# Patient Record
Sex: MALE | Race: WHITE | NOT HISPANIC OR LATINO | Employment: OTHER | URBAN - METROPOLITAN AREA
[De-identification: names, ages, dates, MRNs, and addresses within clinical notes are randomized per-mention and may not be internally consistent; named-entity substitution may affect disease eponyms.]

---

## 2021-11-19 ENCOUNTER — APPOINTMENT (OUTPATIENT)
Dept: GENERAL RADIOLOGY | Facility: CLINIC | Age: 86
DRG: 395 | End: 2021-11-19
Attending: EMERGENCY MEDICINE
Payer: COMMERCIAL

## 2021-11-19 ENCOUNTER — HOSPITAL ENCOUNTER (INPATIENT)
Facility: CLINIC | Age: 86
LOS: 2 days | Discharge: HOME | DRG: 395 | End: 2021-11-22
Attending: EMERGENCY MEDICINE | Admitting: STUDENT IN AN ORGANIZED HEALTH CARE EDUCATION/TRAINING PROGRAM
Payer: COMMERCIAL

## 2021-11-19 ENCOUNTER — APPOINTMENT (OUTPATIENT)
Dept: CT IMAGING | Facility: CLINIC | Age: 86
DRG: 395 | End: 2021-11-19
Attending: EMERGENCY MEDICINE
Payer: COMMERCIAL

## 2021-11-19 DIAGNOSIS — K59.00 CONSTIPATION, UNSPECIFIED CONSTIPATION TYPE: ICD-10-CM

## 2021-11-19 DIAGNOSIS — K56.609 SBO (SMALL BOWEL OBSTRUCTION) (H): ICD-10-CM

## 2021-11-19 LAB
ANION GAP SERPL CALCULATED.3IONS-SCNC: 5 MMOL/L (ref 3–14)
BASOPHILS # BLD AUTO: 0 10E3/UL (ref 0–0.2)
BASOPHILS NFR BLD AUTO: 0 %
BUN SERPL-MCNC: 17 MG/DL (ref 7–30)
CALCIUM SERPL-MCNC: 8.8 MG/DL (ref 8.5–10.1)
CHLORIDE BLD-SCNC: 104 MMOL/L (ref 94–109)
CO2 SERPL-SCNC: 27 MMOL/L (ref 20–32)
CREAT SERPL-MCNC: 1.22 MG/DL (ref 0.66–1.25)
EOSINOPHIL # BLD AUTO: 0.1 10E3/UL (ref 0–0.7)
EOSINOPHIL NFR BLD AUTO: 1 %
ERYTHROCYTE [DISTWIDTH] IN BLOOD BY AUTOMATED COUNT: 13.4 % (ref 10–15)
GFR SERPL CREATININE-BSD FRML MDRD: 49 ML/MIN/1.73M2
GLUCOSE BLD-MCNC: 106 MG/DL (ref 70–99)
HCT VFR BLD AUTO: 43.8 % (ref 40–53)
HGB BLD-MCNC: 14.6 G/DL (ref 13.3–17.7)
IMM GRANULOCYTES # BLD: 0 10E3/UL
IMM GRANULOCYTES NFR BLD: 0 %
LACTATE SERPL-SCNC: 0.9 MMOL/L (ref 0.7–2)
LYMPHOCYTES # BLD AUTO: 0.7 10E3/UL (ref 0.8–5.3)
LYMPHOCYTES NFR BLD AUTO: 6 %
MCH RBC QN AUTO: 32.3 PG (ref 26.5–33)
MCHC RBC AUTO-ENTMCNC: 33.3 G/DL (ref 31.5–36.5)
MCV RBC AUTO: 97 FL (ref 78–100)
MONOCYTES # BLD AUTO: 0.7 10E3/UL (ref 0–1.3)
MONOCYTES NFR BLD AUTO: 7 %
NEUTROPHILS # BLD AUTO: 9 10E3/UL (ref 1.6–8.3)
NEUTROPHILS NFR BLD AUTO: 86 %
NRBC # BLD AUTO: 0 10E3/UL
NRBC BLD AUTO-RTO: 0 /100
PLATELET # BLD AUTO: 262 10E3/UL (ref 150–450)
POTASSIUM BLD-SCNC: 4.6 MMOL/L (ref 3.4–5.3)
RBC # BLD AUTO: 4.52 10E6/UL (ref 4.4–5.9)
SODIUM SERPL-SCNC: 136 MMOL/L (ref 133–144)
WBC # BLD AUTO: 10.5 10E3/UL (ref 4–11)

## 2021-11-19 PROCEDURE — 36415 COLL VENOUS BLD VENIPUNCTURE: CPT | Performed by: EMERGENCY MEDICINE

## 2021-11-19 PROCEDURE — 85025 COMPLETE CBC W/AUTO DIFF WBC: CPT | Performed by: EMERGENCY MEDICINE

## 2021-11-19 PROCEDURE — 74019 RADEX ABDOMEN 2 VIEWS: CPT

## 2021-11-19 PROCEDURE — 51702 INSERT TEMP BLADDER CATH: CPT

## 2021-11-19 PROCEDURE — 81001 URINALYSIS AUTO W/SCOPE: CPT | Performed by: EMERGENCY MEDICINE

## 2021-11-19 PROCEDURE — 80048 BASIC METABOLIC PNL TOTAL CA: CPT | Performed by: EMERGENCY MEDICINE

## 2021-11-19 PROCEDURE — 83605 ASSAY OF LACTIC ACID: CPT | Performed by: EMERGENCY MEDICINE

## 2021-11-19 PROCEDURE — 99285 EMERGENCY DEPT VISIT HI MDM: CPT | Mod: 25

## 2021-11-19 PROCEDURE — 74177 CT ABD & PELVIS W/CONTRAST: CPT

## 2021-11-19 RX ORDER — IOPAMIDOL 755 MG/ML
73 INJECTION, SOLUTION INTRAVASCULAR ONCE
Status: COMPLETED | OUTPATIENT
Start: 2021-11-19 | End: 2021-11-20

## 2021-11-19 ASSESSMENT — ENCOUNTER SYMPTOMS
CONSTIPATION: 1
NAUSEA: 1
VOMITING: 0

## 2021-11-20 ENCOUNTER — APPOINTMENT (OUTPATIENT)
Dept: GENERAL RADIOLOGY | Facility: CLINIC | Age: 86
DRG: 395 | End: 2021-11-20
Attending: SURGERY
Payer: COMMERCIAL

## 2021-11-20 PROBLEM — K59.00 CONSTIPATION, UNSPECIFIED CONSTIPATION TYPE: Status: ACTIVE | Noted: 2021-11-20

## 2021-11-20 PROBLEM — K56.609 SBO (SMALL BOWEL OBSTRUCTION) (H): Status: ACTIVE | Noted: 2021-11-20

## 2021-11-20 LAB
ALBUMIN UR-MCNC: 10 MG/DL
APPEARANCE UR: CLEAR
BILIRUB UR QL STRIP: NEGATIVE
COLOR UR AUTO: YELLOW
GLUCOSE UR STRIP-MCNC: NEGATIVE MG/DL
HGB UR QL STRIP: NEGATIVE
KETONES UR STRIP-MCNC: NEGATIVE MG/DL
LEUKOCYTE ESTERASE UR QL STRIP: ABNORMAL
MUCOUS THREADS #/AREA URNS LPF: PRESENT /LPF
NITRATE UR QL: NEGATIVE
PH UR STRIP: 5.5 [PH] (ref 5–7)
RBC URINE: 2 /HPF
SARS-COV-2 RNA RESP QL NAA+PROBE: NEGATIVE
SP GR UR STRIP: 1.02 (ref 1–1.03)
UROBILINOGEN UR STRIP-MCNC: NORMAL MG/DL
WBC URINE: 4 /HPF

## 2021-11-20 PROCEDURE — 250N000009 HC RX 250: Performed by: EMERGENCY MEDICINE

## 2021-11-20 PROCEDURE — C9803 HOPD COVID-19 SPEC COLLECT: HCPCS

## 2021-11-20 PROCEDURE — 99222 1ST HOSP IP/OBS MODERATE 55: CPT | Performed by: SURGERY

## 2021-11-20 PROCEDURE — 250N000011 HC RX IP 250 OP 636: Performed by: EMERGENCY MEDICINE

## 2021-11-20 PROCEDURE — 250N000013 HC RX MED GY IP 250 OP 250 PS 637: Performed by: EMERGENCY MEDICINE

## 2021-11-20 PROCEDURE — 96360 HYDRATION IV INFUSION INIT: CPT

## 2021-11-20 PROCEDURE — 96361 HYDRATE IV INFUSION ADD-ON: CPT

## 2021-11-20 PROCEDURE — 74018 RADEX ABDOMEN 1 VIEW: CPT

## 2021-11-20 PROCEDURE — 258N000003 HC RX IP 258 OP 636: Performed by: STUDENT IN AN ORGANIZED HEALTH CARE EDUCATION/TRAINING PROGRAM

## 2021-11-20 PROCEDURE — 99207 PR CDG-CODE CATEGORY CHANGED: CPT | Performed by: STUDENT IN AN ORGANIZED HEALTH CARE EDUCATION/TRAINING PROGRAM

## 2021-11-20 PROCEDURE — 99223 1ST HOSP IP/OBS HIGH 75: CPT | Performed by: STUDENT IN AN ORGANIZED HEALTH CARE EDUCATION/TRAINING PROGRAM

## 2021-11-20 PROCEDURE — 99207 PR NO CHARGE LOS: CPT | Performed by: HOSPITALIST

## 2021-11-20 PROCEDURE — G0378 HOSPITAL OBSERVATION PER HR: HCPCS

## 2021-11-20 PROCEDURE — 87635 SARS-COV-2 COVID-19 AMP PRB: CPT | Performed by: EMERGENCY MEDICINE

## 2021-11-20 PROCEDURE — 250N000013 HC RX MED GY IP 250 OP 250 PS 637: Performed by: STUDENT IN AN ORGANIZED HEALTH CARE EDUCATION/TRAINING PROGRAM

## 2021-11-20 PROCEDURE — 120N000004 HC R&B MS OVERFLOW

## 2021-11-20 RX ORDER — VITAMIN E 268 MG
400 CAPSULE ORAL 2 TIMES DAILY
COMMUNITY

## 2021-11-20 RX ORDER — MAGNESIUM CARB/ALUMINUM HYDROX 105-160MG
30 TABLET,CHEWABLE ORAL DAILY PRN
COMMUNITY

## 2021-11-20 RX ORDER — SODIUM CHLORIDE, SODIUM LACTATE, POTASSIUM CHLORIDE, CALCIUM CHLORIDE 600; 310; 30; 20 MG/100ML; MG/100ML; MG/100ML; MG/100ML
INJECTION, SOLUTION INTRAVENOUS CONTINUOUS
Status: DISCONTINUED | OUTPATIENT
Start: 2021-11-20 | End: 2021-11-21

## 2021-11-20 RX ORDER — POLYETHYLENE GLYCOL 3350 17 G/17G
17 POWDER, FOR SOLUTION ORAL DAILY
Status: DISCONTINUED | OUTPATIENT
Start: 2021-11-20 | End: 2021-11-22 | Stop reason: HOSPADM

## 2021-11-20 RX ORDER — ACETAMINOPHEN 325 MG/1
650 TABLET ORAL EVERY 6 HOURS PRN
Status: DISCONTINUED | OUTPATIENT
Start: 2021-11-20 | End: 2021-11-22 | Stop reason: HOSPADM

## 2021-11-20 RX ORDER — BISACODYL 10 MG
10 SUPPOSITORY, RECTAL RECTAL DAILY PRN
Status: DISCONTINUED | OUTPATIENT
Start: 2021-11-20 | End: 2021-11-22 | Stop reason: HOSPADM

## 2021-11-20 RX ORDER — TAMSULOSIN HYDROCHLORIDE 0.4 MG/1
1 CAPSULE ORAL DAILY
COMMUNITY
Start: 2021-03-11 | End: 2022-03-11

## 2021-11-20 RX ORDER — SENNOSIDES 8.6 MG
1-2 TABLET ORAL 2 TIMES DAILY PRN
Status: DISCONTINUED | OUTPATIENT
Start: 2021-11-20 | End: 2021-11-22 | Stop reason: HOSPADM

## 2021-11-20 RX ORDER — ONDANSETRON 2 MG/ML
4 INJECTION INTRAMUSCULAR; INTRAVENOUS EVERY 6 HOURS PRN
Status: DISCONTINUED | OUTPATIENT
Start: 2021-11-20 | End: 2021-11-22 | Stop reason: HOSPADM

## 2021-11-20 RX ORDER — ONDANSETRON 4 MG/1
4 TABLET, ORALLY DISINTEGRATING ORAL EVERY 6 HOURS PRN
Status: DISCONTINUED | OUTPATIENT
Start: 2021-11-20 | End: 2021-11-22 | Stop reason: HOSPADM

## 2021-11-20 RX ORDER — ACETAMINOPHEN 650 MG/1
650 SUPPOSITORY RECTAL EVERY 6 HOURS PRN
Status: DISCONTINUED | OUTPATIENT
Start: 2021-11-20 | End: 2021-11-22 | Stop reason: HOSPADM

## 2021-11-20 RX ORDER — LIDOCAINE 40 MG/G
CREAM TOPICAL
Status: DISCONTINUED | OUTPATIENT
Start: 2021-11-20 | End: 2021-11-22 | Stop reason: HOSPADM

## 2021-11-20 RX ADMIN — SENNOSIDES 1 TABLET: 8.6 TABLET, FILM COATED ORAL at 20:14

## 2021-11-20 RX ADMIN — IOPAMIDOL 73 ML: 755 INJECTION, SOLUTION INTRAVENOUS at 00:05

## 2021-11-20 RX ADMIN — SODIUM CHLORIDE, POTASSIUM CHLORIDE, SODIUM LACTATE AND CALCIUM CHLORIDE: 600; 310; 30; 20 INJECTION, SOLUTION INTRAVENOUS at 03:15

## 2021-11-20 RX ADMIN — DIATRIZOATE MEGLUMINE AND DIATRIZOATE SODIUM 90 ML: 660; 100 SOLUTION ORAL; RECTAL at 10:46

## 2021-11-20 RX ADMIN — ACETAMINOPHEN 650 MG: 325 TABLET, FILM COATED ORAL at 13:58

## 2021-11-20 RX ADMIN — SODIUM CHLORIDE, POTASSIUM CHLORIDE, SODIUM LACTATE AND CALCIUM CHLORIDE: 600; 310; 30; 20 INJECTION, SOLUTION INTRAVENOUS at 13:57

## 2021-11-20 RX ADMIN — DOCUSATE SODIUM 286 ML: 50 LIQUID ORAL at 00:22

## 2021-11-20 RX ADMIN — ACETAMINOPHEN 650 MG: 325 TABLET, FILM COATED ORAL at 22:29

## 2021-11-20 RX ADMIN — ACETAMINOPHEN 325 MG: 325 TABLET, FILM COATED ORAL at 16:44

## 2021-11-20 RX ADMIN — SODIUM CHLORIDE 61 ML: 900 INJECTION INTRAVENOUS at 00:06

## 2021-11-20 ASSESSMENT — ACTIVITIES OF DAILY LIVING (ADL)
ADLS_ACUITY_SCORE: 20
ADLS_ACUITY_SCORE: 19
ADLS_ACUITY_SCORE: 19
ADLS_ACUITY_SCORE: 20
ADLS_ACUITY_SCORE: 19
ADLS_ACUITY_SCORE: 20

## 2021-11-20 NOTE — ED PROVIDER NOTES
History   Chief Complaint:  Constipation    HPI   Musa Rao is a 89 year old male with history of enlarged prostate who presents via EMS with constipation, worse in the last week. He also mentions that he has not urinated in at least a day. Adds that he is nauseous but denies vomiting. Denies any previous abdominal surgeries.     Review of Systems   Gastrointestinal: Positive for constipation and nausea. Negative for vomiting.   Genitourinary: Positive for decreased urine volume.   All other systems reviewed and are negative.      Allergies:  The patient has no known allergies.     Medications:  Flomax    Past Medical History:     Enlarged prostate    BPH    Past Surgical History:    The patient denies past abdominal surgical history.     Social History:  The patient presents to the ED alone via EMS    Physical Exam     Patient Vitals for the past 24 hrs:   BP Temp Temp src SpO2 Weight   11/19/21 2350 (!) 168/93 -- -- 98 % --   11/19/21 2349 -- -- -- -- 64.9 kg (143 lb)   11/19/21 2317 -- 98.2  F (36.8  C) Oral -- --   11/19/21 2300 (!) 171/96 -- -- 98 % --       Physical Exam  Vitals and nursing note reviewed.   Constitutional:       Comments: Elderly frail-appearing   HENT:      Head: Normocephalic.   Cardiovascular:      Rate and Rhythm: Normal rate and regular rhythm.   Pulmonary:      Effort: Pulmonary effort is normal.      Breath sounds: Normal breath sounds.   Abdominal:      Comments: Moderately distended decreased bowel sounds.  Mildly tender diffusely distended bladder.   Genitourinary:     Comments: Rectum is a moderate amount of stool in the rectum.  No external hemorrhoids.  Manual disimpaction performed with a small amount of stool removed  Skin:     General: Skin is warm.      Capillary Refill: Capillary refill takes less than 2 seconds.   Neurological:      Mental Status: He is alert and oriented to person, place, and time.   Psychiatric:         Mood and Affect: Mood normal.          Emergency Department Course     Imaging:  Abdomen XR, 2 vw, flat and upright   Final Result   IMPRESSION: Abundant air present throughout the GI tract within small bowel and colon most suggestive of adynamic ileus versus distal colonic obstruction. Moderate stool seen within colon. On the decubitus views there is no definite evidence for    pneumoperitoneum.       CT Abdomen Pelvis w Contrast    (Results Pending)     Report per radiology    Laboratory:  Labs Ordered and Resulted from Time of ED Arrival to Time of ED Departure   BASIC METABOLIC PANEL - Abnormal       Result Value    Sodium 136      Potassium 4.6      Chloride 104      Carbon Dioxide (CO2) 27      Anion Gap 5      Urea Nitrogen 17      Creatinine 1.22      Calcium 8.8      Glucose 106 (*)     GFR Estimate 49 (*)    CBC WITH PLATELETS AND DIFFERENTIAL - Abnormal    WBC Count 10.5      RBC Count 4.52      Hemoglobin 14.6      Hematocrit 43.8      MCV 97      MCH 32.3      MCHC 33.3      RDW 13.4      Platelet Count 262      % Neutrophils 86      % Lymphocytes 6      % Monocytes 7      % Eosinophils 1      % Basophils 0      % Immature Granulocytes 0      NRBCs per 100 WBC 0      Absolute Neutrophils 9.0 (*)     Absolute Lymphocytes 0.7 (*)     Absolute Monocytes 0.7      Absolute Eosinophils 0.1      Absolute Basophils 0.0      Absolute Immature Granulocytes 0.0      Absolute NRBCs 0.0     LACTIC ACID WHOLE BLOOD - Normal    Lactic Acid 0.9     ROUTINE UA WITH MICROSCOPIC REFLEX TO CULTURE        Emergency Department Course:  Reviewed:  I reviewed nursing notes and vitals    Assessments:  2243 I obtained history and examined the patient as noted above. Bladder scanner: 550 mL    2324 I rechecked the patient.     Consults:  DR DUANE JACOB GENERAL SURGERY 12:50AM    Interventions:  Medications   pink lady enema (COMPOUNDED: docusate, magnesium citrate, mineral oil, sodium phosphate) (has no administration in time range)   iopamidol (ISOVUE-370)  solution 73 mL (has no administration in time range)   Saline flush (has no administration in time range)   iopamidol (ISOVUE-370) solution 73 mL (has no administration in time range)   Saline flush (has no administration in time range)     Disposition:  The patient was admitted to the hospital under the care of Dr. Santamaria.     Impression & Plan     Medical Decision Making:  Patient presents distended abdomen and difficulty going to the bathroom.  Patient has no history of prior surgery in the abdomen clinical suspicions are for fecal impaction a mild amount of disimpaction was performed by me but patient did not tolerate this well and enema was attempted and he did not tolerate this well as well.  Plain films of the abdomen suggest a large amount of stool in the rectum and colon family is concerned about small bowel obstruction and therefore CT was offered is a further assessment.  CT is read by radiology as possible mechanical small bowel obstruction as well as a large amount of stool in the colon I did not patient is a negative lactic acid but due to the internal hernia read I did consult general surgery who felt it was not an emergent consult and will follow up in the morning.  Care was discussed with Dr. Arce and we will admit for observation.      Diagnosis:    ICD-10-CM    1. Constipation, unspecified constipation type  K59.00    2. SBO (small bowel obstruction) (H)  K56.609        Discharge Medications:  New Prescriptions    No medications on file     Scribe Disclosure:  I, Nehemias North, am serving as a scribe at 10:43 PM on 11/19/2021 to document services personally performed by Patrick Shelton MD based on my observations and the provider's statements to me.                 Patrick Shelton MD  11/20/21 0050

## 2021-11-20 NOTE — PLAN OF CARE
A/Ox4, forgetful a times. VSS on RA. Denies pain/nausea. Abdomen distended soft to touch, BS active. Loose/ semi-formed brown BM x2, on bowel regime. Ax1 W/GB, mobility slow w/ rounded back. Rios cath in place for retention, leaking small smount blood at urethral site at times.Has bandage on left toe from PTA abrasion. LR infusing @ 100ml/hr. Xray,CT, UA done, CT found possible SBO/internal hernia. NPO w/ ice chips for surgery consult.     Observation goals  PRIOR TO DISCHARGE        -diagnostic tests and consults completed and resulted not met  -vital signs normal or at patient baseline met  -tolerating oral intake to maintain hydration not met  -adequate pain control on oral analgesics met  -safe disposition plan has been identified  met

## 2021-11-20 NOTE — PROGRESS NOTES
RECEIVING UNIT ED HANDOFF REVIEW    ED Nurse Handoff Report was reviewed by: Alexandra Gleason RN on November 20, 2021 at 2:30 AM

## 2021-11-20 NOTE — PLAN OF CARE
Observation Goals     -diagnostic tests and consults completed and resulted Not Met  -vital signs normal or at patient baseline Met BP (!) 123/38 (BP Location: Left arm)   Pulse 69   Temp 99.4  F (37.4  C) (Oral)   Resp 16   Wt 64.9 kg (143 lb)   SpO2 93%       -tolerating oral intake to maintain hydration Not Met: NPO, IVF running  -adequate pain control on oral analgesics Met  -safe disposition plan has been identified Not Met  Nurse to notify provider when observation goals have been met and patient is ready for discharge.    Pt is A&Ox4, VSS, and is NPO. Pt seen by surgery this AM (see note for details) and gastrografin xray ordered. Gastrografin given at 1100, xray to happen around 1900. Pt's dtr Yolanda at the bedside. Pt had loose stool overnight but had a small, formed BM this AM. Pt is up with Ax1 w/ walker, Rios in place and draining well, small amount of blood leaking from insertion site. LR@100 mL/hr. Nurse will continue to monitor.

## 2021-11-20 NOTE — PROGRESS NOTES
MD Notification    Notified Person: MD    Notified Person Name: Karolina      Notification Date/Time: 11/20/2021 2825       Notification Interaction: via text        Purpose of Notification: pt having severe spasm like pain that comes and goes. Current order is NPO. Please advise if ok to give tylenol    Orders Received:    Comments:

## 2021-11-20 NOTE — PLAN OF CARE
Shift [id number]    Neuro: A&Ox4, intermittently forgetful  Cardiac: WDL  Respiratory: No complaints of SOB, lung sounds diminished in the bases  GI/: Rios in place, small amounts of blood at the urethra, draining well. Loose stool overnight, small, soft, formed BM this shift.  Diet/appetite: NPO  Activity: Ax1 w/ walker  Pain: Denied pain most of the morning, in pain this afternoon, agreed to take 1 tab of Tylenol  Skin: WDL  Lines: RPIV infusing LR@100 mL/hr    BP (!) 123/38 (BP Location: Left arm)   Pulse 69   Temp 99.4  F (37.4  C) (Oral)   Resp 16   Wt 64.9 kg (143 lb)   SpO2 93%

## 2021-11-20 NOTE — CONSULTS
General Surgery Consultation    Musa Rao MRN# [id number]   Age: 89 year old YOB: 1922     Date of Admission:  11/19/2021    Reason for consult: Small bowel obstruction       Requesting physician: MD Bing                Assessment and Plan:   Assessment:   89-year-old gentleman brought to the emergency department due to abdominal distention and constipation.  Imaging suggestive for small bowel obstruction.  Concern on imaging for mesenteric swirling suggestive of possible internal herniation.      Plan:   Patient's abdominal exam is completely unremarkable.  He has no tenderness to palpation.  Does not have significant discomfort or pain at baseline.  Has started to have liquid stools.  No prior history of abdominal surgery.  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  We'll at this point try Gastroview challenge.             Chief Complaint:   Abdominal distention and constipation     History is obtained from the patient and electronic health record         History of Present Illness:   This patient is a 89 year old  male  who presents with chief complaint of constipation and abdominal discomfort.  He presented the emergency department last night with these complaints.  He has some mild nausea but no vomiting.  Denied any other significant recent changes in health.  He is also had some urinary retention related to his constipation.  Since admission he started to have bowel movements.  He presently denies significant discomfort or pain.  Denies nausea.          Past Medical History:    has no past medical history on file.          Past Surgical History:   No past surgical history on file.  Specifically denies history of abdominal surgery.        Medications:   No current facility-administered medications on file prior to encounter.  tamsulosin (FLOMAX) 0.4 MG capsule, Take 1 capsule by mouth daily          Allergies:    No Known Allergies          Social History:   Denies tobacco or significant alcohol use.          Family History:   The patient has no family history of any bleeding, clotting or anesthesia problems.          Review of Systems:   Ten point Review of Systems is negative other than noted in the HPI          Physical Exam:   Gen:  This is a well developed, well nourished man in no apparent distress.  Blood pressure 127/53, pulse 88, temperature 99.3  F (37.4  C), temperature source Oral, resp. rate 16, weight 64.9 kg (143 lb), SpO2 94 %.  HEENT - Normocephalic, atraumatic, mucous membranes moist.  no scleral icterus.  Neck - supple without masses  Lungs - clear to ascultation.    Heart - Regular rate & rhythm without murmur  Abdomen:   soft, non-distended, non-tender and no masses palpated, normal bowel sounds   Extremities - warm without edema  Neurologic - nonfocal          Data:   WBC -   WBC Count   Date Value Ref Range Status   11/19/2021 10.5 4.0 - 11.0 10e3/uL Final   ], HgB -   Hemoglobin   Date Value Ref Range Status   11/19/2021 14.6 13.3 - 17.7 g/dL Final   ]   Liver Function Studies - No results for input(s): PROTTOTAL, ALBUMIN, BILITOTAL, ALKPHOS, AST, ALT, BILIDIRECT in the last 95891 hours.  CT scan of the abdomen:   Personally reviewed   Ultrasound of the abdomen:     Curtis Galdamez MD

## 2021-11-20 NOTE — ED TRIAGE NOTES
Daughters reported decreased pooping over last week and no BM for last 3 days. c/o abdominal pain from belly button to rectum. Daughters tried enema and 2 suppositories which just made him nauseous.

## 2021-11-20 NOTE — UTILIZATION REVIEW
[facility]   Admission Status; Secondary Review Determination   Admission date:  11/19/2021 10:34 PM    Under the authority of the Utilization Management Committee, the utilization review process indicated a secondary review on the above patient. The review outcome is based on review of the medical records, discussions with staff, and applying clinical experience noted on the date of the review.     (x) Inpatient Status Appropriate - This patient's medical care is consistent with medical management for inpatient care and reasonable inpatient medical practice.     RATIONALE FOR DETERMINATION   89-year-old male was admitted this morning with abdominal pain. Imaging is suggestive of small bowel obstruction. There is also concern for mesenteric swirling suggestive of possible internal hernia. General surgery has been consulted and plan for Gastrografin challenge and conservative management at this time. Patient's she still showing no resolution of his obstruction. This patient is elderly, complicated, high risk for clinical deterioration, likely be hospital several days, and appropriate to advance to inpatient status at the time of this review. This recommendation was paged to Dr. Turcios.  The severity of illness, intensity of service provided, expected LOS and risk for adverse outcome make the care complex, high risk and appropriate for hospital admission.    At the time of admission with the information available to the attending physician more than 2 nights Hospital complex care was anticipated, based on patient risk of adverse outcome if treated as outpatient and complex care required.  Inpatient admission is appropriate based on the Medicare guidelines.      The information on this document is developed by the utilization review team in order for the business office to ensure compliance. This only denotes the appropriateness of proper admission status and does not reflect the quality of care  rendered.   The definitions of Inpatient Status and Observation Status used in making the determination above are those provided in the CMS Coverage Manual, Chapter 1 and Chapter 6, section 70.4.     Sincerely,   Bhavin Brenner DO MPH   Physician Advisor  Utilization Review  [facility]

## 2021-11-20 NOTE — ED NOTES
[facility]  ED Nurse Handoff Report    ED Chief complaint: Constipation (Daughters reported decreased pooping over last week and no BM for last 3 days. c/o abdominal pain from belly button to rectum. Daughters tried enema and 2 suppositories which just made him nauseous.)      ED Diagnosis:   Final diagnoses:   None       Code Status: code status to e address    Allergies: No Known Allergies    Patient Story: patient here with constipation. He stated he has been constipated with abdominal pain for 3 days.his daughter tried enema which did not help. Patient lives alone ,ambulates with a walker.  His daughter help him out most of the time  Focused Assessment:  See above    Treatments and/or interventions provided: xray,ct ,labs and a enema which he could not hold  Patient's response to treatments and/or interventions: ongoing    To be done/followed up on inpatient unit:  .    Does this patient have any cognitive concerns?:     Activity level - Baseline/Home:  Walker  Activity Level - Current:   Walker    Patient's Preferred language: English   Needed?: No    Isolation: None  Infection: Not Applicable  Patient tested for COVID 19 prior to admission: YES  Bariatric?: No    Vital Signs:   Vitals:    11/19/21 2300 11/19/21 2317 11/19/21 2349 11/19/21 2350   BP: (!) 171/96   (!) 168/93   Temp:  98.2  F (36.8  C)     TempSrc:  Oral     SpO2: 98%   98%   Weight:   64.9 kg (143 lb)        Cardiac Rhythm:     Was the PSS-3 completed:   Yes  What interventions are required if any?               Family Comments: daughter here  With patient  OBS brochure/video discussed/provided to patient/family: No              Name of person given brochure if not patient: .              Relationship to patient: .    For the majority of the shift this patient's behavior was Green.   Behavioral interventions performed were none.    ED NURSE PHONE NUMBER: [phone]

## 2021-11-20 NOTE — ED NOTES
Bed: ED10  Expected date:   Expected time:   Means of arrival:   Comments:  HEMS 432 89M constipation eta 1802

## 2021-11-20 NOTE — PHARMACY-ADMISSION MEDICATION HISTORY
Pharmacy Medication History  Admission medication history interview status for the 11/19/2021  admission is complete. See EPIC admission navigator for prior to admission medications     Location of Interview: Outside patient room but on unit  Medication history sources: Patient's family/friend (Daughter Cyndee), Surescripts and Care Everywhere    Significant changes made to the medication list:  NA - New list    In the past week, patient estimated taking medication this percent of the time: greater than 90%    Additional medication history information:   Patients daughters tried to use OTC enema & suppositories (unsure of exact medications) but were unsuccessful as they wouldn't stay inserted.     Medication reconciliation completed by provider prior to medication history? N/A    Time spent in this activity: 20 minutes    Prior to Admission medications    Medication Sig Last Dose Taking? Auth Provider   mineral oil liquid Take 30 mLs by mouth daily as needed for constipation 11/19/2021 at PRN Yes Unknown, Entered By History   tamsulosin (FLOMAX) 0.4 MG capsule Take 1 capsule by mouth daily Past Week at Unknown time Yes Unknown, Entered By History   vitamin E (TOCOPHEROL) 400 units (180 mg) capsule Take 400 Units by mouth 2 times daily Past Week at Unknown time Yes Unknown, Entered By History       The information provided in this note is only as accurate as the sources available at the time of update(s)

## 2021-11-20 NOTE — PROGRESS NOTES
[facility]    Medicine Progress Note - Hospitalist Service       Date of Admission:  11/19/2021    Assessment & Plan         Musa Rao is a 89 year old male admitted on 11/19/2021. He presents with constipation / abdominal discomfort.    SBO (small bowel obstruction) (H)  Constipation, unspecified constipation type  Presented with 1 week history of worsening constipation.  CT abdomen/pelvis on admission showed evidence for possible mechanical small bowel obstruction. Additionally, there was some twisting seen in the mesentery in the right lower quadrant raising suspicion for potential internal hernia as a cause of the obstruction. No abnormal bowel wall thickening. No evidence for bowel wall pneumatosis or free air.    Admit to inpatient.    General surgery consulted, appreciate their assistance.    Symptoms much improved today per general surgery. Plan for gastrograffin challenge.    NPO.    IV fluids.    As needed pain and nausea medications.       Diet: NPO for Medical/Clinical Reasons Except for: Ice Chips    DVT Prophylaxis: Pneumatic Compression Devices  Rios Catheter: PRESENT, indication: Retention  Central Lines: None  Code Status: No CPR- Pre-arrest intubation OK      Disposition Plan   Expected discharge: Admit to inpatient.  He will be in the hospital at least 1 more night.     The patient's care was discussed with the Bedside Nurse and Patient's Family.    Reynaldo Turcios MD  Hospitalist Service  [facility]  Securely message with the Vocera Web Console (learn more here)  Text page via [employer] Paging/Directory        Clinically Significant Risk Factors Present on Admission                   ______________________________________________________________________    Interval History   Musa Rao was seen this morning.  He was sleeping and I did not wake him up.  Discussed plan of care with family outside the room this morning.    Data  reviewed today: I reviewed all medications, new labs and imaging results over the last 24 hours. I personally reviewed no images or EKG's today.    Physical Exam   Vital Signs: Temp: 99.3  F (37.4  C) Temp src: Oral BP: 127/53 Pulse: 88   Resp: 16 SpO2: 94 % O2 Device: None (Room air)    Weight: 143 lbs 0 oz  Constitutional: Sleeping in bed, I did not try to wake him up.    Data   Recent Labs   Lab 11/19/21  2305   WBC 10.5   HGB 14.6   MCV 97         POTASSIUM 4.6   CHLORIDE 104   CO2 27   BUN 17   CR 1.22   ANIONGAP 5   JEFF 8.8   *     Medications     lactated ringers 100 mL/hr at 11/20/21 1357       polyethylene glycol  17 g Oral Daily     sodium chloride (PF)  3 mL Intracatheter Q8H

## 2021-11-20 NOTE — PLAN OF CARE
Observation Goals    -diagnostic tests and consults completed and resulted Not Met  -vital signs normal or at patient baseline Met /53 (BP Location: Left arm)   Pulse 88   Temp 99.3  F (37.4  C) (Oral)   Resp 16   Wt 64.9 kg (143 lb)   SpO2 94%     -tolerating oral intake to maintain hydration Not Met: NPO, IVF running  -adequate pain control on oral analgesics Met  -safe disposition plan has been identified Not Met  Nurse to notify provider when observation goals have been met and patient is ready for discharge.

## 2021-11-20 NOTE — H&P
[facility]    History and Physical - Hospitalist Service       Date of Admission:  11/19/2021    Assessment & Plan      Musa Rao is a 89 year old male admitted on 11/19/2021. He presents with constipation / abdominal discomfort.      Constipation, unspecified constipation type    SBO (small bowel obstruction) (H)    Assessment: Presents with 1 week history of worsening constipation.  CT abdomen on admission shows evidence for possible mechanical small bowel obstruction. Additionally, there is some twisting seen in the mesentery in the right lower quadrant raising suspicion for potential internal hernia as a cause of the obstruction. No abnormal bowel wall thickening. No evidence for bowel wall pneumatosis or free air.    Plan:   - admit to observation  - General surgery eval  - Bowel regimen  - IVF  - Follow vitals / temp       Diet: NPO for Medical/Clinical Reasons Except for: Ice Chips   DVT Prophylaxis: Pneumatic Compression Devices  Rios Catheter: PRESENT, indication: Retention  Central Lines: None  Code Status:   DNR    Clinically Significant Risk Factors Present on Admission                   Disposition Plan   Expected discharge:  tomorrow recommended to prior living arrangement once improvement in constipation.     The patient's care was discussed with the Patient and ED Provider.    Karan Smart MD  [facility]  Securely message with the Vocera Web Console (learn more here)  Text page via [email or web address] Paging/Directory        ______________________________________________________________________    Chief Complaint     Constipation    History is obtained from the patient    History of Present Illness      Musa Rao is a 89 year old male with past medical history of enlarged prostate who presents for evaluation of constipation.    Patient ports that for the past week, he has had worsening constipation and abdominal discomfort.  He endorses  nausea but no vomiting.  He denies any recent fevers or chills.  He denies any recent weight loss.  He has no prior history of bowel obstructions, abdominal malignancy.  He denies any chest pain or shortness of breath.  He denies any recent new medications or recent use of narcotics.  He reports that his constipation worsens the point where he had not urinated for day.  He denies any recent dysuria or hematuria.  At this time he has no other complaints.    Review of Systems      The 10 point Review of Systems is negative other than noted in the HPI or here.     Past Medical History      I have reviewed this patient's medical history and updated it with pertinent information if needed.   No past medical history on file.    Past Surgical History      I have reviewed this patient's surgical history and updated it with pertinent information if needed.  No past surgical history on file.    Social History   I have reviewed this patient's social history and updated it with pertinent information if needed.  Social History     Tobacco Use     Smoking status: None     Smokeless tobacco: None   Substance Use Topics     Alcohol use: None     Drug use: None       Family History   I have reviewed this patient's family history and updated it with pertinent information if needed.  Family History   Problem Relation Age of Onset     No Known Problems Mother      No Known Problems Father      Prior to Admission Medications   None     Allergies   No Known Allergies    Physical Exam   Vital Signs: Temp: 98.4  F (36.9  C) Temp src: Oral BP: 139/75 Pulse: 88   Resp: 20 SpO2: 97 % O2 Device: None (Room air)    Weight: 143 lbs 0 oz    Constitutional: awake, alert, cooperative, no apparent distress.   Eyes: Lids and lashes normal, pupils equal, round and reactive to light   ENT: Normocephalic, without obvious abnormality, atraumatic, sinuses nontender on palpation   Hematologic / Lymphatic: no cervical lymphadenopathy   Respiratory: CTABL    Cardiovascular: RRR with no m/r/g   GI: hypoactive bowel sounds, soft, non-distended, non-tender.   Skin: normal skin color, texture, turgor   Musculoskeletal: There is no redness, warmth, or swelling of the joints. Full range of motion noted.   Neurologic: Awake, alert, oriented to name, place and time. Cranial nerves II-XII are grossly intact. Motor is 5 out of 5 bilaterally. Sensory is intact.   Neuropsychiatric: normal mood and affect      Data   Data reviewed today: I reviewed all medications, new labs and imaging results over the last 24 hours. I personally reviewed the abdominal x-ray image(s) showing see below and the abdominal CT image(s) showing see below.    CT ABDOMEN  IMPRESSION:   1.  Evidence for mechanical small bowel obstruction as detailed above. Additionally, there is some twisting seen in the mesentery in the right lower quadrant raising suspicion for potential internal hernia as a cause of the obstruction. No abnormal bowel wall thickening. No evidence for bowel wall pneumatosis or free air.  2.  Large amount of stool throughout the colon.  3.  Prostatic enlargement. Rios catheter in the bladder which is decompressed. No hydronephrosis.    AXR  IMPRESSION: Abundant air present throughout the GI tract within small bowel and colon most suggestive of adynamic ileus versus distal colonic obstruction. Moderate stool seen within colon. On the decubitus views there is no definite evidence for   pneumoperitoneum.       Most Recent 3 CBC's:  Recent Labs   Lab Test 11/19/21  2305   WBC 10.5   HGB 14.6   MCV 97        Most Recent 3 BMP's:  Recent Labs   Lab Test 11/19/21  2305      POTASSIUM 4.6   CHLORIDE 104   CO2 27   BUN 17   CR 1.22   ANIONGAP 5   JEFF 8.8   *     Most Recent 2 LFT's:No lab results found.  Most Recent 3 INR's:No lab results found.  Recent Results (from the past 24 hour(s))   Abdomen XR, 2 vw, flat and upright    Narrative    EXAM: XR ABDOMEN 2VIEWS  LOCATION: [facility]  DATE/TIME: 11/19/2021 11:17 PM    INDICATION: ABDOMINAL DISTENSION  COMPARISON: None.      Impression    IMPRESSION: Abundant air present throughout the GI tract within small bowel and colon most suggestive of adynamic ileus versus distal colonic obstruction. Moderate stool seen within colon. On the decubitus views there is no definite evidence for   pneumoperitoneum.    CT Abdomen Pelvis w Contrast    Narrative    EXAM: CT ABDOMEN PELVIS W CONTRAST  LOCATION: [facility]  DATE/TIME: 11/20/2021 12:14 AM    INDICATION: Abdominal distention.  COMPARISON: None.  TECHNIQUE: CT scan of the abdomen and pelvis was performed following injection of IV contrast. Multiplanar reformats were obtained. Dose reduction techniques were used.  CONTRAST: 73 mL Isovue-370    FINDINGS:   LOWER CHEST: Subpleural linear scarring and/or atelectasis in the lung bases.    HEPATOBILIARY: Normal.    PANCREAS: Normal.    SPLEEN: Normal.    ADRENAL GLANDS: Normal.    KIDNEYS/BLADDER: There are a couple tiny renal cysts with no specific follow-up needed. Kidneys are otherwise negative. No hydronephrosis. Rios catheter in a decompressed bladder.    BOWEL: There are multiple mildly dilated mid and proximal loops of small bowel with multiple air-fluid levels present. The very distal small bowel at the terminal ileum is decompressed. Findings are concerning for mechanical small bowel obstruction.   There is some abnormal twisting of the mesentery in the right lower quadrant along with some mild edema. Findings raise the possibility of potential internal hernia as a cause of obstruction. No obvious bowel wall thickening and no free air. Large amount   of stool throughout the colon.    LYMPH NODES: Normal.    VASCULATURE: Normal caliber abdominal aorta.    PELVIC ORGANS: Prostatic enlargement.    MUSCULOSKELETAL: Degenerative and hypertrophic changes lower thoracic and lumbar spine. Kyphotic  deformity at the thoracolumbar junction with moderate compression fracture of the L1 vertebral body. Small fat-containing left inguinal hernia.      Impression    IMPRESSION:   1.  Evidence for mechanical small bowel obstruction as detailed above. Additionally, there is some twisting seen in the mesentery in the right lower quadrant raising suspicion for potential internal hernia as a cause of the obstruction. No abnormal bowel   wall thickening. No evidence for bowel wall pneumatosis or free air.    2.  Large amount of stool throughout the colon.    3.  Prostatic enlargement. Rios catheter in the bladder which is decompressed. No hydronephrosis.

## 2021-11-21 LAB
ANION GAP SERPL CALCULATED.3IONS-SCNC: 7 MMOL/L (ref 3–14)
BUN SERPL-MCNC: 19 MG/DL (ref 7–30)
CALCIUM SERPL-MCNC: 8.1 MG/DL (ref 8.5–10.1)
CHLORIDE BLD-SCNC: 108 MMOL/L (ref 94–109)
CO2 SERPL-SCNC: 24 MMOL/L (ref 20–32)
CREAT SERPL-MCNC: 1.14 MG/DL (ref 0.66–1.25)
ERYTHROCYTE [DISTWIDTH] IN BLOOD BY AUTOMATED COUNT: 13.3 % (ref 10–15)
GFR SERPL CREATININE-BSD FRML MDRD: 53 ML/MIN/1.73M2
GLUCOSE BLD-MCNC: 64 MG/DL (ref 70–99)
GLUCOSE BLDC GLUCOMTR-MCNC: 117 MG/DL (ref 70–99)
HCT VFR BLD AUTO: 39.1 % (ref 40–53)
HGB BLD-MCNC: 12.6 G/DL (ref 13.3–17.7)
MCH RBC QN AUTO: 31.8 PG (ref 26.5–33)
MCHC RBC AUTO-ENTMCNC: 32.2 G/DL (ref 31.5–36.5)
MCV RBC AUTO: 99 FL (ref 78–100)
PLATELET # BLD AUTO: 259 10E3/UL (ref 150–450)
POTASSIUM BLD-SCNC: 4.4 MMOL/L (ref 3.4–5.3)
RBC # BLD AUTO: 3.96 10E6/UL (ref 4.4–5.9)
SODIUM SERPL-SCNC: 139 MMOL/L (ref 133–144)
WBC # BLD AUTO: 7.6 10E3/UL (ref 4–11)

## 2021-11-21 PROCEDURE — 36415 COLL VENOUS BLD VENIPUNCTURE: CPT | Performed by: STUDENT IN AN ORGANIZED HEALTH CARE EDUCATION/TRAINING PROGRAM

## 2021-11-21 PROCEDURE — 250N000013 HC RX MED GY IP 250 OP 250 PS 637: Performed by: HOSPITALIST

## 2021-11-21 PROCEDURE — 120N000004 HC R&B MS OVERFLOW

## 2021-11-21 PROCEDURE — 250N000013 HC RX MED GY IP 250 OP 250 PS 637: Performed by: PHYSICIAN ASSISTANT

## 2021-11-21 PROCEDURE — 99232 SBSQ HOSP IP/OBS MODERATE 35: CPT | Performed by: HOSPITALIST

## 2021-11-21 PROCEDURE — 99231 SBSQ HOSP IP/OBS SF/LOW 25: CPT | Performed by: SURGERY

## 2021-11-21 PROCEDURE — 85027 COMPLETE CBC AUTOMATED: CPT | Performed by: STUDENT IN AN ORGANIZED HEALTH CARE EDUCATION/TRAINING PROGRAM

## 2021-11-21 PROCEDURE — 258N000003 HC RX IP 258 OP 636: Performed by: STUDENT IN AN ORGANIZED HEALTH CARE EDUCATION/TRAINING PROGRAM

## 2021-11-21 PROCEDURE — 80048 BASIC METABOLIC PNL TOTAL CA: CPT | Performed by: STUDENT IN AN ORGANIZED HEALTH CARE EDUCATION/TRAINING PROGRAM

## 2021-11-21 RX ORDER — SIMETHICONE 80 MG
80 TABLET,CHEWABLE ORAL EVERY 6 HOURS PRN
Status: DISCONTINUED | OUTPATIENT
Start: 2021-11-21 | End: 2021-11-22 | Stop reason: HOSPADM

## 2021-11-21 RX ORDER — CALCIUM CARBONATE 500 MG/1
500 TABLET, CHEWABLE ORAL 2 TIMES DAILY PRN
Status: DISCONTINUED | OUTPATIENT
Start: 2021-11-21 | End: 2021-11-22 | Stop reason: HOSPADM

## 2021-11-21 RX ORDER — TAMSULOSIN HYDROCHLORIDE 0.4 MG/1
0.4 CAPSULE ORAL DAILY
Status: DISCONTINUED | OUTPATIENT
Start: 2021-11-21 | End: 2021-11-22 | Stop reason: HOSPADM

## 2021-11-21 RX ADMIN — SODIUM CHLORIDE, POTASSIUM CHLORIDE, SODIUM LACTATE AND CALCIUM CHLORIDE: 600; 310; 30; 20 INJECTION, SOLUTION INTRAVENOUS at 00:14

## 2021-11-21 RX ADMIN — TAMSULOSIN HYDROCHLORIDE 0.4 MG: 0.4 CAPSULE ORAL at 08:47

## 2021-11-21 RX ADMIN — SIMETHICONE 80 MG: 80 TABLET, CHEWABLE ORAL at 12:45

## 2021-11-21 ASSESSMENT — ACTIVITIES OF DAILY LIVING (ADL)
ADLS_ACUITY_SCORE: 16
ADLS_ACUITY_SCORE: 18
EQUIPMENT_CURRENTLY_USED_AT_HOME: WALKER, STANDARD
ADLS_ACUITY_SCORE: 16
DRESSING/BATHING_DIFFICULTY: YES
ADLS_ACUITY_SCORE: 18
DIFFICULTY_COMMUNICATING: NO
ADLS_ACUITY_SCORE: 19
ADLS_ACUITY_SCORE: 19
DOING_ERRANDS_INDEPENDENTLY_DIFFICULTY: YES
ADLS_ACUITY_SCORE: 19
ADLS_ACUITY_SCORE: 16
ADLS_ACUITY_SCORE: 21
ADLS_ACUITY_SCORE: 19
ADLS_ACUITY_SCORE: 16
DIFFICULTY_EATING/SWALLOWING: NO
CONCENTRATING,_REMEMBERING_OR_MAKING_DECISIONS_DIFFICULTY: NO
WALKING_OR_CLIMBING_STAIRS: AMBULATION DIFFICULTY, REQUIRES EQUIPMENT
ADLS_ACUITY_SCORE: 19
ADLS_ACUITY_SCORE: 18
ADLS_ACUITY_SCORE: 18
ADLS_ACUITY_SCORE: 16
ADLS_ACUITY_SCORE: 18
ADLS_ACUITY_SCORE: 19
WEAR_GLASSES_OR_BLIND: NO
ADLS_ACUITY_SCORE: 19
ADLS_ACUITY_SCORE: 18
ADLS_ACUITY_SCORE: 16
ADLS_ACUITY_SCORE: 18
ADLS_ACUITY_SCORE: 18
WALKING_OR_CLIMBING_STAIRS_DIFFICULTY: YES
DRESSING/BATHING: BATHING DIFFICULTY, REQUIRES EQUIPMENT
ADLS_ACUITY_SCORE: 18
TOILETING_ISSUES: NO
FALL_HISTORY_WITHIN_LAST_SIX_MONTHS: NO
ADLS_ACUITY_SCORE: 18

## 2021-11-21 NOTE — PROGRESS NOTES
[facility]    General Surgery  Daily Progress Note       Assessment and Plan:   Musa Rao is a 89 year old male with small bowel obstruction, resolving. Passed GGC yesterday. Patient denies history of abdominal surgery.    PLAN:  - Clear liquid diet this morning, may advance to fulls later today if tolerates this.  - Saline lock once adequate oral intake.  - Patient should abide by low fiber diet for 1 week upon discharge.  - Encourage ambulate QID.  - General Surgery will continue to follow.    DISPOSITION:  - From surgical standpoint, discharge as early as tomorrow if tolerates low fiber diet.        Interval History:   Musa Rao is seen this morning on surgical rounds. He had 3 BM's yesterday and states abdominal pain has resolved since. He does get intermittent cramping prior to BM's but this goes away on its own. He denies nausea with clear liquids this morning. Abdominal film last night with contrast throughout colon and no distended loops of bowel. Hypertensive at times, vitals otherwise wnl.         Physical Exam:   Temp: 98.4  F (36.9  C) Temp src: Oral BP: (!) 147/61 Pulse: 65   Resp: 16 SpO2: 95 % O2 Device: None (Room air)      I/O last 3 completed shifts:  In: -   Out: 775 [Urine:775]    GENERAL: VS reviewed, alert, oriented, no acute distress  LUNGS: Normal respiratory effort, no wheezing  ABDOMEN:  Soft, nontender, non-distended and good bowel sounds  EXTREMITIES: Moving all extremities, no gross deformities  NEUROLOGICAL: Grossly non-focal, mood & affect appropriate    Data   Recent Labs   Lab 11/21/21  0714 11/19/21  2305   WBC 7.6 10.5   HGB 12.6* 14.6   MCV 99 97    262    136   POTASSIUM 4.4 4.6   CHLORIDE 108 104   CO2 24 27   BUN 19 17   CR 1.14 1.22   ANIONGAP 7 5   JEFF 8.1* 8.8   GLC 64* 106*     ABDOMEN XR 11/20  IMPRESSION: There is contrast material throughout the colon, indicating that the small bowel is patent.  There are no  distended air-filled loops of small bowel. No intraperitoneal free air identified on this supine study. There are degenerative changes   in the spine.    Marianne Orellana PA-C    15 minutes spent on date of the encounter doing patient visit, chart review, and documentation.

## 2021-11-21 NOTE — PROGRESS NOTES
[facility]    Medicine Progress Note - Hospitalist Service       Date of Admission:  11/19/2021    Assessment & Plan            Musa Rao is a 89 year old male admitted on 11/19/2021. He presents with constipation / abdominal discomfort.    SBO (small bowel obstruction) (H)  Constipation, unspecified constipation type  Presented with 1 week history of worsening constipation.  CT abdomen/pelvis on admission showed evidence for possible mechanical small bowel obstruction. Additionally, there was some twisting seen in the mesentery in the right lower quadrant raising suspicion for potential internal hernia as a cause of the obstruction. No abnormal bowel wall thickening. No evidence for bowel wall pneumatosis or free air.    Admit to inpatient.    General surgery consulted, appreciate their assistance.    Gastrograffin challenge on 11/20/21 showed contrast throughout the colon.    Symptoms much improved. Denies nausea/abdominal pain. Had 3 BMs in past 24 hours.    Tolerating clear liquids, will advance to full liquids. Can advance to low fiber diet later today if doing well.    Discontinue IV fluids.    Ambulate with nursing.    Possible discharge home tomorrow.    Urinary retention  BPH    Restart PTA tamsulosin.    Remove Rios catheter today for voiding trial. Monitor for urinary retention after Rios removal.    COVID-19 PCR negative    Routine admission COVID-19 PCR testing was negative on 11/20/21.       Diet: Full Liquid Diet  Low Fiber Diet    DVT Prophylaxis: Pneumatic Compression Devices  Rios Catheter: PRESENT, indication: Retention  Central Lines: None  Code Status: No CPR- Pre-arrest intubation OK      Disposition Plan   Expected discharge: likely home tomorrow if no further symptoms and tolerating oral intake     The patient's care was discussed with the Bedside Nurse, Patient and Patient's Family.    Reynaldo Turcios MD  Hospitalist Service  [facility]  [facility]  Securely message with the Vocera Web Console (learn more here)  Text page via [employer] Paging/Directory        Clinically Significant Risk Factors Present on Admission                   ______________________________________________________________________    Interval History   Musa Rao was seen this morning. Feels much better. Tired this morning.  Denies nausea and abdominal pain. Has had three good bowel movements in the past 24 hours. Denies fevers, chest pain, shortness of breath. Discussed options for bowel regimen after discharge. Daughter was in the room with him, discussed plan of care.    Data reviewed today: I reviewed all medications, new labs and imaging results over the last 24 hours. I personally reviewed no images or EKG's today.    Physical Exam   Vital Signs: Temp: 98.4  F (36.9  C) Temp src: Oral BP: (!) 147/61 Pulse: 65   Resp: 16 SpO2: 95 % O2 Device: None (Room air)    Weight: 148 lbs 8 oz  Constitutional: awake, alert, cooperative, no apparent distress, laying in the hospital bed  Respiratory: clear to auscultation bilaterally, no crackles or wheezing  Cardiovascular: regular rate and rhythm, normal S1 and S2, no murmur noted  GI: normal bowel sounds, soft, non-distended, non-tender  Skin: warm, dry  Musculoskeletal: no lower extremity pitting edema present  Neurologic: awake, alert, oriented to name, place and time, moves all extremities    Data   Recent Labs   Lab 11/21/21  1029 11/21/21  0714 11/19/21  2305   WBC  --  7.6 10.5   HGB  --  12.6* 14.6   MCV  --  99 97   PLT  --  259 262   NA  --  139 136   POTASSIUM  --  4.4 4.6   CHLORIDE  --  108 104   CO2  --  24 27   BUN  --  19 17   CR  --  1.14 1.22   ANIONGAP  --  7 5   JEFF  --  8.1* 8.8   * 64* 106*     Medications       polyethylene glycol  17 g Oral Daily     sodium chloride (PF)  3 mL Intracatheter Q8H     tamsulosin  0.4 mg Oral Daily

## 2021-11-21 NOTE — PLAN OF CARE
Inpatient. Patient is alert and oriented x4. VSS on room air. Up SBA/Assist of 1 gb and walker. Infusing  ml/hr, inserted new IV line right fore arm. NPO ex meds. Gastrografin done. Prn tylenol given for pain. PRN senokot given. + BM x3, large and soft. IS in place. Rios in place, patent. Skin is intact. Offer T&R. General surgery following. Continue to monitor.

## 2021-11-21 NOTE — PLAN OF CARE
Assumed care today at 1530. Pt is a/o X4. VSS. NPO since MN. Up with 1 person assist to the bathroom. Some loose stools present with some solid chunks. Incontinence. Rios patent. Intermittent  spasm  like pain in lower abdomen. Heat pack and tylenol given with some relief. Pain comes and goes. Pt also having some rectal pain from straining and enemas. Barrier cream applied. Skin intact. Awaiting gastrografin Xray. Pt dtr at the bedside and is very involved in care. Incentive spirometry instructions given. Atelectasis on Ct chest.  Pt demonstrated and tolerated IS well. Dtr concerned about low grade fever in low . Pt denies any chills. Will continue to monitor.

## 2021-11-21 NOTE — PROGRESS NOTES
AM labs show blood sugar of 64, pt given apple juice since a CLD was ordered.    Recheck after apple juice and broth is 117

## 2021-11-21 NOTE — PLAN OF CARE
Shift [id number]    Neuro: A&Ox4, intermittently confused  Cardiac: WDL  Respiratory: Lung sounds mildly diminished at the bases, encouraged use of IS  GI/: Rios removed this afternoon, 3 large BMs overnignt  Diet/appetite: Tolerating a full liquid diet, to advance to low fiber diet later today if full liquid is tolerated  Activity: Up with assist of 1 with GB and walker  Pain: Denies pain this shift  Skin: WDL  Lines: LR discontinued, PIV SL    /52 (BP Location: Left arm)   Pulse 60   Temp 98.5  F (36.9  C) (Oral)   Resp 16   Wt 67.4 kg (148 lb 8 oz)   SpO2 93%

## 2021-11-22 ENCOUNTER — APPOINTMENT (OUTPATIENT)
Dept: PHYSICAL THERAPY | Facility: CLINIC | Age: 86
DRG: 395 | End: 2021-11-22
Attending: HOSPITALIST
Payer: COMMERCIAL

## 2021-11-22 VITALS
RESPIRATION RATE: 18 BRPM | HEART RATE: 75 BPM | OXYGEN SATURATION: 93 % | TEMPERATURE: 98.2 F | WEIGHT: 148.5 LBS | DIASTOLIC BLOOD PRESSURE: 73 MMHG | SYSTOLIC BLOOD PRESSURE: 133 MMHG

## 2021-11-22 PROCEDURE — 250N000013 HC RX MED GY IP 250 OP 250 PS 637: Performed by: HOSPITALIST

## 2021-11-22 PROCEDURE — 250N000013 HC RX MED GY IP 250 OP 250 PS 637: Performed by: STUDENT IN AN ORGANIZED HEALTH CARE EDUCATION/TRAINING PROGRAM

## 2021-11-22 PROCEDURE — 99239 HOSP IP/OBS DSCHRG MGMT >30: CPT | Performed by: HOSPITALIST

## 2021-11-22 PROCEDURE — 99231 SBSQ HOSP IP/OBS SF/LOW 25: CPT | Performed by: SURGERY

## 2021-11-22 PROCEDURE — 97530 THERAPEUTIC ACTIVITIES: CPT | Mod: GP

## 2021-11-22 PROCEDURE — 97116 GAIT TRAINING THERAPY: CPT | Mod: GP

## 2021-11-22 PROCEDURE — 97161 PT EVAL LOW COMPLEX 20 MIN: CPT | Mod: GP

## 2021-11-22 RX ORDER — TETRAHYDROZOLINE HCL 0.05 %
1 DROPS OPHTHALMIC (EYE) 4 TIMES DAILY PRN
Status: DISCONTINUED | OUTPATIENT
Start: 2021-11-22 | End: 2021-11-22 | Stop reason: HOSPADM

## 2021-11-22 RX ORDER — SENNOSIDES 8.6 MG
1-2 TABLET ORAL 2 TIMES DAILY PRN
COMMUNITY
Start: 2021-11-22

## 2021-11-22 RX ORDER — POLYETHYLENE GLYCOL 3350 17 G/17G
17 POWDER, FOR SOLUTION ORAL DAILY
Qty: 510 G | Refills: 0 | Status: SHIPPED | OUTPATIENT
Start: 2021-11-22

## 2021-11-22 RX ADMIN — TETRAHYDROZOLINE HCL 1 DROP: 0.05 LIQUID OPHTHALMIC at 13:25

## 2021-11-22 RX ADMIN — POLYETHYLENE GLYCOL 3350 17 G: 17 POWDER, FOR SOLUTION ORAL at 13:28

## 2021-11-22 RX ADMIN — TAMSULOSIN HYDROCHLORIDE 0.4 MG: 0.4 CAPSULE ORAL at 09:07

## 2021-11-22 ASSESSMENT — ACTIVITIES OF DAILY LIVING (ADL)
ADLS_ACUITY_SCORE: 16
ADLS_ACUITY_SCORE: 16
ADLS_ACUITY_SCORE: 14
ADLS_ACUITY_SCORE: 16
ADLS_ACUITY_SCORE: 14
ADLS_ACUITY_SCORE: 14
ADLS_ACUITY_SCORE: 16
ADLS_ACUITY_SCORE: 14
ADLS_ACUITY_SCORE: 16
ADLS_ACUITY_SCORE: 14
ADLS_ACUITY_SCORE: 16
ADLS_ACUITY_SCORE: 14
ADLS_ACUITY_SCORE: 14
ADLS_ACUITY_SCORE: 16

## 2021-11-22 NOTE — PROGRESS NOTES
Surgery    Doing great today.  Tolerating omelette without nausea. Passing significant flatus.  No pain.    Abdomen-soft without distention.  No tenderness throughout.    A/P  Abdominal x-ray shows contrast throughout the colon.  Patient tolerating diet without issues.  Okay to be discharged from a surgical standpoint.  The patient should adhere to a low fiber diet and should likely take MiraLAX on a daily basis going forward.  This was discussed with the patient at length.  No follow-up required at the surgical office.    Jarrett Purdy M.D.  [facility]  [phone]

## 2021-11-22 NOTE — PLAN OF CARE
Physical Therapy Discharge Summary    Reason for therapy discharge:    All goals and outcomes met, no further needs identified.    Progress towards therapy goal(s). See goals on Care Plan in [facility] electronic health record for goal details.  Goals met    Therapy recommendation(s):    Continued therapy is recommended.  Rationale/Recommendations:  home PT recommended, pt refusing and was given LE strengthening handouts.

## 2021-11-22 NOTE — CONSULTS
Care Management Initial Consult    General Information  Assessment completed with: patient and daughter Yolanda       Primary Care Provider verified and updated as needed:  Yes, [facility]  Readmission within the last 30 days:  No        Advance Care Planning:   No ACP Documents on File        Communication Assessment  Patient's communication style: spoken language (English or Bilingual)    Hearing Difficulty or Deaf: no   Wear Glasses or Blind: no    Cognitive  Cognitive/Neuro/Behavioral: WDL  Level of Consciousness: alert  Arousal Level: opens eyes spontaneously  Orientation: oriented x 4  Mood/Behavior: calm,cooperative  Best Language: 0 - No aphasia  Speech: clear,logical    Living Environment:   People in home:  Lives in his own home (main level) in [place]. He has a roommate on the lower level.    Current living Arrangements: house      Able to return to prior arrangements:  Yes-with assistance of his 5 children.       Family/Social Support:  Care provided by:  Self, independent with his ADL's, independent with food preparation/eating. Uses a walker (has trouble with his balance).  Provides care for:  No one                Description of Support System:  Has 5 children ( 1 boy, 4 girls). They all live locally. Very supportive, caring.       Current Resources:   Patient receiving home care services:  No-he also refuses Home Care services this admission.     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 [employer]       Financial Concerns:  None, has Medical Insurance-BCBS Medicare Advantage          Lifestyle & Psychosocial Needs:  Social Determinants of Health     Tobacco Use: Not on file   Alcohol Use: Not on file   Financial Resource Strain: Not on file   Food Insecurity: Not on file   Transportation Needs: Not on file    Physical Activity: Not on file   Stress: Not on file   Social Connections: Not on file   Intimate Partner Violence: Not on file   Depression: Not on file   Housing Stability: Not on file       Functional Status:  Prior to admission patient needed assistance:  Yes,  Family assists with driving, grocery shopping, errands, cleaning and pt's roommate does the laundry. Pt reports he does light cooking.     Mental Health Status:  No concerns        Chemical Dependency Status:   No concerns             Values/Beliefs:  Spiritual, Cultural Beliefs, Latter day Practices, Values that affect care:  No Scientology              Additional Information:  Writer met with patient and daughter, Yolanda in room to discuss discharge planning. Yolanda and her 4 other siblings who all live locally are planning on rotating to stay 24/7 with patient for the next 2 weeks or until he feels he is back to his baseline. Patient feels at this time he would not benefit from Home Care services and has declined Home Care at this time. Patient and daughter informed if things change when patient gets home, to call his PCP to reassess for Home Care needs. Patient and daughter also informed to see his PCP on a more regular basis and to schedule his hospital follow-up within 7 days. Daughter will be making his follow-up appointment. Patient has adequate family support at discharge. No further discharge needs.           Makayla Gregg RN  Care Coordinator  [phone]

## 2021-11-22 NOTE — PROGRESS NOTES
A&O. A1 gb/w. Low fiber diet. RA. No IV. Denies pain. Incont of bowel. Voiding using Urinal. Rios removed 11-21. Baseline numb in feet. Numb thumb few months. Promote ambulation and fluid. Likely discharge tomorrow.

## 2021-11-22 NOTE — PLAN OF CARE
[id number]: VSS on room air. A/Ox4. Skin intact. Denies pain. Up with Asstx1,GB and walker, ambulated in room on shift.  Low fiber for dinner, tolerated well. BS active, Flatus +, small BM on shift. Voiding adequately to bathroom post clements removal today. LS dim.

## 2021-11-22 NOTE — PLAN OF CARE
Inpatient status. Pt A&O X4. VSS on RA. Up Ax1 Gb/Walker. No PIV. Denies pain. Low fiber diet. Incont of bowel. Rios catheter removed yesterday. Possible discharge today. Continue to monitor.

## 2021-11-22 NOTE — DISCHARGE SUMMARY
Discharge instructions given. Questions answered. Prescriptions sent to [facility] pharmacy. Education provided.  Pt A/Ox4, VSS. Constipation resolved. CC/SS and Dietitian consults completed. Pt discharged home. Pt declined Home Care services for now, plan to see PCP.

## 2021-11-22 NOTE — PROGRESS NOTES
11/22/21 1054   Quick Adds   Type of Visit Initial PT Evaluation   Living Environment   People in home other (see comments)  (roommate)   Current Living Arrangements house   Home Accessibility no concerns   Living Environment Comments Pt lives on the main level of the home, the roommate on the lower level   Self-Care   Usual Activity Tolerance fair   Current Activity Tolerance fair   Regular Exercise Yes   Activity/Exercise Type walking   Exercise Amount/Frequency daily   Equipment Currently Used at Home walker, rolling  (4WW)   Activity/Exercise/Self-Care Comment Walks 1/4 mile loop in the home. Family assists with driving, grocery shopping, errands, cleaning and pt's roommate does the laundry. Pt reports he does light cooking.   Disability/Function   Fall history within last six months yes   Number of times patient has fallen within last six months 1   General Information   Onset of Illness/Injury or Date of Surgery 11/21/21   Referring Physician Reynaldo Turcios MD   Patient/Family Therapy Goals Statement (PT) Return home   Pertinent History of Current Problem (include personal factors and/or comorbidities that impact the POC) Pt admitted with constipation and SBO, urinary retention. PMH: none   Existing Precautions/Restrictions fall   Weight-Bearing Status - LLE full weight-bearing   Weight-Bearing Status - RLE full weight-bearing   General Observations Pt's daughter present, reports that between herself and her siblings, somebody will be staying overnight and during the day with pt for at least a week.   Cognition   Orientation Status (Cognition) oriented x 3   Follows Commands (Cognition) WFL   Pain Assessment   Patient Currently in Pain No   Posture    Posture Forward head position;Protracted shoulders;Kyphosis   Posture Comments Significant kyphosis d/t spine fractures 8 yrs ago   Range of Motion (ROM)   ROM Quick Adds ROM WFL   Strength   Strength Comments B hip flex 4/5, knee ext 4+/5, DF R 4/5, DF L  3+/5   Bed Mobility   Comment (Bed Mobility) Independent supine to/from sit   Transfers   Transfer Safety Comments SBA sit to/from stand with FWW, extra time needed   Gait/Stairs (Locomotion)   Comment (Gait/Stairs) Pt amb 10 ft with FWW and CGA, slow pace, L foot dec foot clearance, short step length   Balance   Balance Comments Requires FWW for safe balance   Sensory Examination   Sensory Perception Comments Baseline numbness in distal fingers and toes   Clinical Impression   Criteria for Skilled Therapeutic Intervention yes, treatment indicated   PT Diagnosis (PT) Difficulty ambulating   Influenced by the following impairments Dec strength, balance, activity tolerance   Functional limitations due to impairments Difficulty ambulating and transferring   Clinical Presentation Stable/Uncomplicated   Clinical Presentation Rationale medically improved   Clinical Decision Making (Complexity) low complexity   Therapy Frequency (PT) One time eval and treatment only   Predicted Duration of Therapy Intervention (days/wks) 1 day   Planned Therapy Interventions (PT) gait training;strengthening;patient/family education;transfer training   Risk & Benefits of therapy have been explained evaluation/treatment results reviewed;care plan/treatment goals reviewed;risks/benefits reviewed;current/potential barriers reviewed;participants voiced agreement with care plan;patient;daughter   PT Discharge Planning    PT Discharge Recommendation (DC Rec) home with home care physical therapy   PT Rationale for DC Rec Pt would benefit from home PT at discharge but is currently declining this. Family plans to stay with him 24 hrs/day for a minimum of 1 week. LE strengthening HEP handouts given.   Total Evaluation Time   Total Evaluation Time (Minutes) 15

## 2021-11-22 NOTE — DISCHARGE SUMMARY
[facility]  Hospitalist Discharge Summary      Date of Admission:  11/19/2021  Date of Discharge:  11/22/2021  Discharging Provider: Reynaldo Turcios MD      Discharge Diagnoses   SBO (small bowel obstruction) (H)  Constipation, unspecified constipation type  Urinary retention  BPH  COVID-19 PCR negative    Follow-ups Needed After Discharge   Follow-up Appointments     Follow-up and recommended labs and tests       Follow up with primary care provider within 7 days for hospital follow-   up.  No follow up labs or test are needed.             Discharge Disposition   Discharged to home  Condition at discharge: Stable    Hospital Course   Musa Rao is a 89 year old male admitted on 11/19/2021. He presents with constipation / abdominal discomfort.    SBO (small bowel obstruction) (H)  Constipation, unspecified constipation type  Presented with 1 week history of worsening constipation.  CT abdomen/pelvis on admission showed evidence for possible mechanical small bowel obstruction. Additionally, there was some twisting seen in the mesentery in the right lower quadrant raising suspicion for potential internal hernia as a cause of the obstruction. No abnormal bowel wall thickening. No evidence for bowel wall pneumatosis or free air.    Admitted to inpatient.    General surgery consulted, appreciate their assistance.    Gastrograffin challenge on 11/20/21 showed contrast throughout the colon.    Symptoms much improved. Denies nausea/abdominal pain. Had multiple bowel movements in the hospital.    Diet was able to be advanced and he is now tolerating a low fiber diet. He was seen by nutrition and educated about a low fiber diet.    He has been seen by physical therapy.  He will have family staying with him at all times after discharge. Will consider adding home health PT depending depending on how the next week goes.    Discharge home today. Discharge plan discussed with his daughter in  the room today, she was in agreement with the plan.    Continue miralax daily.    Add PRN senna-s if miralax not effective.    Follow-up with PCP in one week.    Discussed reasons to seek medical attention prior to follow-up appointment.    Urinary retention  BPH    Continue PTA tamsulosin.    Rios placed initially, removed on 11/21/21. Has been voiding well on his own since the catheter was removed.    COVID-19 PCR negative    Routine admission COVID-19 PCR testing was negative on 11/20/21.    Consultations This Hospital Stay   SURGERY GENERAL IP CONSULT  PHYSICAL THERAPY ADULT IP CONSULT  NUTRITION SERVICES ADULT IP CONSULT  CARE MANAGEMENT / SOCIAL WORK IP CONSULT    Code Status   No CPR- Pre-arrest intubation OK    Time Spent on this Encounter   I, Reynaldo Turcios MD, personally saw the patient today and spent greater than 30 minutes discharging this patient.       Reynaldo Turcios MD  [facility]AY  [address] [place] [id number]  Phone: [phone]  ______________________________________________________________________    Physical Exam   Vital Signs: Temp: 98.2  F (36.8  C) Temp src: Oral BP: 133/73 Pulse: 75   Resp: 18 SpO2: 93 % O2 Device: None (Room air)    Weight: 148 lbs 8 oz  Constitutional: awake, alert, cooperative, no apparent distress, laying in the hospital bed  Respiratory: clear to auscultation bilaterally, no crackles or wheezing  Cardiovascular: regular rate and rhythm, normal S1 and S2, no murmur noted  GI: normal bowel sounds, soft, non-distended, non-tender  Skin: warm, dry  Musculoskeletal: no lower extremity pitting edema present  Neurologic: awake, alert, answers questions appropriately, moves all extremities       Primary Care Physician   Physician No Ref-Primary    Discharge Orders      Reason for your hospital stay    Constipation/bowel obstruction that resolved with medical management     Follow-up and recommended  labs and tests     Follow up with primary care provider within 7 days for hospital follow- up.  No follow up labs or test are needed.     Activity    Your activity upon discharge: activity as tolerated     Diet    Follow this diet upon discharge: Low Fiber Diet     Significant Results and Procedures   Most Recent 3 CBC's:Recent Labs   Lab Test 11/21/21  0714 11/19/21  2305   WBC 7.6 10.5   HGB 12.6* 14.6   MCV 99 97    262     Most Recent 3 BMP's:Recent Labs   Lab Test 11/21/21  1029 11/21/21  0714 11/19/21  2305   NA  --  139 136   POTASSIUM  --  4.4 4.6   CHLORIDE  --  108 104   CO2  --  24 27   BUN  --  19 17   CR  --  1.14 1.22   ANIONGAP  --  7 5   JEFF  --  8.1* 8.8   * 64* 106*     Results for orders placed or performed during the hospital encounter of 11/19/21   Abdomen XR, 2 vw, flat and upright    Narrative    EXAM: XR ABDOMEN 2VIEWS  LOCATION: [facility]  DATE/TIME: 11/19/2021 11:17 PM    INDICATION: ABDOMINAL DISTENSION  COMPARISON: None.      Impression    IMPRESSION: Abundant air present throughout the GI tract within small bowel and colon most suggestive of adynamic ileus versus distal colonic obstruction. Moderate stool seen within colon. On the decubitus views there is no definite evidence for   pneumoperitoneum.    CT Abdomen Pelvis w Contrast    Narrative    EXAM: CT ABDOMEN PELVIS W CONTRAST  LOCATION: [facility]  DATE/TIME: 11/20/2021 12:14 AM    INDICATION: Abdominal distention.  COMPARISON: None.  TECHNIQUE: CT scan of the abdomen and pelvis was performed following injection of IV contrast. Multiplanar reformats were obtained. Dose reduction techniques were used.  CONTRAST: 73 mL Isovue-370    FINDINGS:   LOWER CHEST: Subpleural linear scarring and/or atelectasis in the lung bases.    HEPATOBILIARY: Normal.    PANCREAS: Normal.    SPLEEN: Normal.    ADRENAL GLANDS: Normal.    KIDNEYS/BLADDER: There are a couple tiny renal cysts  with no specific follow-up needed. Kidneys are otherwise negative. No hydronephrosis. Rios catheter in a decompressed bladder.    BOWEL: There are multiple mildly dilated mid and proximal loops of small bowel with multiple air-fluid levels present. The very distal small bowel at the terminal ileum is decompressed. Findings are concerning for mechanical small bowel obstruction.   There is some abnormal twisting of the mesentery in the right lower quadrant along with some mild edema. Findings raise the possibility of potential internal hernia as a cause of obstruction. No obvious bowel wall thickening and no free air. Large amount   of stool throughout the colon.    LYMPH NODES: Normal.    VASCULATURE: Normal caliber abdominal aorta.    PELVIC ORGANS: Prostatic enlargement.    MUSCULOSKELETAL: Degenerative and hypertrophic changes lower thoracic and lumbar spine. Kyphotic deformity at the thoracolumbar junction with moderate compression fracture of the L1 vertebral body. Small fat-containing left inguinal hernia.      Impression    IMPRESSION:   1.  Evidence for mechanical small bowel obstruction as detailed above. Additionally, there is some twisting seen in the mesentery in the right lower quadrant raising suspicion for potential internal hernia as a cause of the obstruction. No abnormal bowel   wall thickening. No evidence for bowel wall pneumatosis or free air.    2.  Large amount of stool throughout the colon.    3.  Prostatic enlargement. Rios catheter in the bladder which is decompressed. No hydronephrosis.   XR Abdomen 1 View    Narrative    EXAM: XR ABDOMEN 1 VIEW  LOCATION: [facility]  DATE/TIME: 11/20/2021 7:00 PM    INDICATION: Gastrografin UGI Challenge, timed for 1900  COMPARISON: CT 11/20/2021. Radiograph 11/19/2021.       Impression    IMPRESSION: There is contrast material throughout the colon, indicating that the small bowel is patent.  There are no distended air-filled  loops of small bowel. No intraperitoneal free air identified on this supine study. There are degenerative changes   in the spine.     Discharge Medications   Current Discharge Medication List      START taking these medications    Details   polyethylene glycol (MIRALAX) 17 GM/Dose powder Take 17 g by mouth daily  Qty: 510 g, Refills: 0    Associated Diagnoses: Constipation, unspecified constipation type      sennosides (SENOKOT) 8.6 MG tablet Take 1-2 tablets by mouth 2 times daily as needed for constipation (use if miralax not effective)    Associated Diagnoses: Constipation, unspecified constipation type         CONTINUE these medications which have NOT CHANGED    Details   mineral oil liquid Take 30 mLs by mouth daily as needed for constipation      tamsulosin (FLOMAX) 0.4 MG capsule Take 1 capsule by mouth daily      vitamin E (TOCOPHEROL) 400 units (180 mg) capsule Take 400 Units by mouth 2 times daily           Allergies   No Known Allergies

## 2021-11-22 NOTE — PLAN OF CARE
RN:  Patient A/O x4.  VSS on RA.  Constipation resolving.  PT consult completed. SW/CC and Dietitian to completed consults prior to patient's discharge to home today.  Surgery signed off.  Anticipating patient to discharge to home yet today once remaining consults are completed.  Patient tolerating diet.  Up with SBA/GB/walker.  Patient plans to pursue home PT through his primary MD when he needs it.

## 2021-11-22 NOTE — CONSULTS
Received consult to see pt - concern for malnutrition  Pt going home soon - defer full assessment  Per Surgery, pt to follow a low fiber diet  RN states pt/dtr looking for information on low fiber diet  Provided and reviewed handout with pt and dtr  Dtr with several questions  Discussed good foods to consume at home  Handout left, phone number to office also provided  Questions answered    Haily Partida RD, LD  Clinical Dietitian - [facility]   Pager - [phone]